# Patient Record
Sex: FEMALE | Race: WHITE | NOT HISPANIC OR LATINO
[De-identification: names, ages, dates, MRNs, and addresses within clinical notes are randomized per-mention and may not be internally consistent; named-entity substitution may affect disease eponyms.]

---

## 2017-11-02 ENCOUNTER — MEDICATION RENEWAL (OUTPATIENT)
Age: 67
End: 2017-11-02

## 2017-11-02 ENCOUNTER — APPOINTMENT (OUTPATIENT)
Dept: OBGYN | Facility: CLINIC | Age: 67
End: 2017-11-02
Payer: COMMERCIAL

## 2017-11-02 VITALS
BODY MASS INDEX: 26.33 KG/M2 | SYSTOLIC BLOOD PRESSURE: 129 MMHG | HEART RATE: 79 BPM | WEIGHT: 158 LBS | DIASTOLIC BLOOD PRESSURE: 79 MMHG | HEIGHT: 65 IN

## 2017-11-02 DIAGNOSIS — Z87.59 PERSONAL HISTORY OF OTHER COMPLICATIONS OF PREGNANCY, CHILDBIRTH AND THE PUERPERIUM: ICD-10-CM

## 2017-11-02 DIAGNOSIS — Z86.73 PERSONAL HISTORY OF TRANSIENT ISCHEMIC ATTACK (TIA), AND CEREBRAL INFARCTION W/OUT RESIDUAL DEFICITS: ICD-10-CM

## 2017-11-02 DIAGNOSIS — Z87.898 PERSONAL HISTORY OF OTHER SPECIFIED CONDITIONS: ICD-10-CM

## 2017-11-02 DIAGNOSIS — Z87.448 PERSONAL HISTORY OF OTHER DISEASES OF URINARY SYSTEM: ICD-10-CM

## 2017-11-02 PROCEDURE — 99213 OFFICE O/P EST LOW 20 MIN: CPT | Mod: 25

## 2017-11-02 PROCEDURE — 99387 INIT PM E/M NEW PAT 65+ YRS: CPT

## 2017-11-02 RX ORDER — CHLORHEXIDINE GLUCONATE, 0.12% ORAL RINSE 1.2 MG/ML
0.12 SOLUTION DENTAL
Qty: 473 | Refills: 0 | Status: COMPLETED | COMMUNITY
Start: 2017-09-05

## 2017-11-02 RX ORDER — CIPROFLOXACIN HYDROCHLORIDE 500 MG/1
500 TABLET, FILM COATED ORAL
Qty: 20 | Refills: 0 | Status: COMPLETED | COMMUNITY
Start: 2017-10-05

## 2017-11-02 RX ORDER — CONJUGATED ESTROGENS 0.62 MG/G
0.62 CREAM VAGINAL
Qty: 90 | Refills: 3 | Status: ACTIVE | COMMUNITY
Start: 2017-11-02 | End: 1900-01-01

## 2017-11-02 RX ORDER — ALPRAZOLAM 0.5 MG/1
0.5 TABLET ORAL
Qty: 90 | Refills: 0 | Status: ACTIVE | COMMUNITY
Start: 2017-08-15

## 2017-11-02 RX ORDER — HYDROCORTISONE 25 MG/G
2.5 CREAM TOPICAL DAILY
Qty: 1 | Refills: 1 | Status: ACTIVE | COMMUNITY
Start: 2017-11-02 | End: 1900-01-01

## 2017-11-03 LAB — HPV HIGH+LOW RISK DNA PNL CVX: NOT DETECTED

## 2017-11-06 LAB — CYTOLOGY CVX/VAG DOC THIN PREP: NORMAL

## 2017-11-08 ENCOUNTER — TRANSCRIPTION ENCOUNTER (OUTPATIENT)
Age: 67
End: 2017-11-08

## 2017-12-11 ENCOUNTER — APPOINTMENT (OUTPATIENT)
Dept: DERMATOLOGY | Facility: CLINIC | Age: 67
End: 2017-12-11
Payer: COMMERCIAL

## 2017-12-11 PROCEDURE — 11200 RMVL SKIN TAGS UP TO&INC 15: CPT | Mod: 59

## 2017-12-11 PROCEDURE — 17110 DESTRUCTION B9 LES UP TO 14: CPT

## 2017-12-11 PROCEDURE — 99203 OFFICE O/P NEW LOW 30 MIN: CPT | Mod: 25

## 2017-12-11 PROCEDURE — D0134: CPT

## 2018-01-30 ENCOUNTER — APPOINTMENT (OUTPATIENT)
Dept: OBGYN | Facility: CLINIC | Age: 68
End: 2018-01-30
Payer: COMMERCIAL

## 2018-01-30 VITALS
BODY MASS INDEX: 26.33 KG/M2 | HEIGHT: 65 IN | DIASTOLIC BLOOD PRESSURE: 78 MMHG | WEIGHT: 158 LBS | SYSTOLIC BLOOD PRESSURE: 124 MMHG

## 2018-01-30 PROCEDURE — 99214 OFFICE O/P EST MOD 30 MIN: CPT

## 2018-01-30 RX ORDER — SIMVASTATIN 20 MG/1
20 TABLET, FILM COATED ORAL
Qty: 90 | Refills: 0 | Status: ACTIVE | COMMUNITY
Start: 2017-12-11

## 2018-01-30 RX ORDER — NITROFURANTOIN MACROCRYSTALS 50 MG/1
50 CAPSULE ORAL
Qty: 40 | Refills: 0 | Status: COMPLETED | COMMUNITY
Start: 2017-10-19 | End: 2018-01-30

## 2018-01-30 RX ORDER — PNEUMOCOCCAL 23-VAL P-SAC VAC 25MCG/0.5
25 VIAL (ML) INJECTION
Qty: 1 | Refills: 0 | Status: DISCONTINUED | COMMUNITY
Start: 2017-09-21 | End: 2018-01-30

## 2018-01-30 RX ORDER — SIMVASTATIN 10 MG/1
10 TABLET, FILM COATED ORAL
Qty: 30 | Refills: 0 | Status: COMPLETED | COMMUNITY
Start: 2017-11-01

## 2018-05-08 ENCOUNTER — APPOINTMENT (OUTPATIENT)
Dept: DERMATOLOGY | Facility: CLINIC | Age: 68
End: 2018-05-08
Payer: COMMERCIAL

## 2018-05-08 PROCEDURE — 99213 OFFICE O/P EST LOW 20 MIN: CPT

## 2018-06-18 ENCOUNTER — APPOINTMENT (OUTPATIENT)
Dept: DERMATOLOGY | Facility: CLINIC | Age: 68
End: 2018-06-18
Payer: COMMERCIAL

## 2018-06-18 PROCEDURE — 99213 OFFICE O/P EST LOW 20 MIN: CPT

## 2018-07-22 PROBLEM — Z86.73 HISTORY OF STROKE: Status: RESOLVED | Noted: 2017-11-02 | Resolved: 2018-07-22

## 2018-09-05 ENCOUNTER — RX RENEWAL (OUTPATIENT)
Age: 68
End: 2018-09-05

## 2018-09-06 ENCOUNTER — RX RENEWAL (OUTPATIENT)
Age: 68
End: 2018-09-06

## 2018-09-06 RX ORDER — HYDROCORTISONE 25 MG/G
2.5 CREAM TOPICAL DAILY
Qty: 1 | Refills: 1 | Status: ACTIVE | COMMUNITY
Start: 2018-09-05 | End: 1900-01-01

## 2018-11-08 ENCOUNTER — APPOINTMENT (OUTPATIENT)
Dept: OBGYN | Facility: CLINIC | Age: 68
End: 2018-11-08
Payer: COMMERCIAL

## 2018-11-08 VITALS
SYSTOLIC BLOOD PRESSURE: 126 MMHG | BODY MASS INDEX: 27.16 KG/M2 | HEIGHT: 65 IN | WEIGHT: 163 LBS | DIASTOLIC BLOOD PRESSURE: 75 MMHG | HEART RATE: 63 BPM

## 2018-11-08 DIAGNOSIS — Z86.19 PERSONAL HISTORY OF OTHER INFECTIOUS AND PARASITIC DISEASES: ICD-10-CM

## 2018-11-08 PROCEDURE — 99213 OFFICE O/P EST LOW 20 MIN: CPT

## 2018-11-08 RX ORDER — TERCONAZOLE 4 MG/G
0.4 CREAM VAGINAL
Qty: 1 | Refills: 1 | Status: ACTIVE | COMMUNITY
Start: 2018-11-08 | End: 1900-01-01

## 2018-11-12 LAB — BACTERIA GENITAL AEROBE CULT: ABNORMAL

## 2018-11-23 ENCOUNTER — MEDICATION RENEWAL (OUTPATIENT)
Age: 68
End: 2018-11-23

## 2018-11-23 RX ORDER — ESTRADIOL 0.1 MG/G
0.1 CREAM VAGINAL
Qty: 1 | Refills: 3 | Status: ACTIVE | COMMUNITY
Start: 2017-11-02 | End: 1900-01-01

## 2018-12-11 ENCOUNTER — APPOINTMENT (OUTPATIENT)
Dept: DERMATOLOGY | Facility: CLINIC | Age: 68
End: 2018-12-11

## 2019-05-20 ENCOUNTER — TRANSCRIPTION ENCOUNTER (OUTPATIENT)
Age: 69
End: 2019-05-20

## 2019-06-04 ENCOUNTER — APPOINTMENT (OUTPATIENT)
Dept: OBGYN | Facility: CLINIC | Age: 69
End: 2019-06-04

## 2019-07-22 ENCOUNTER — APPOINTMENT (OUTPATIENT)
Dept: DERMATOLOGY | Facility: CLINIC | Age: 69
End: 2019-07-22
Payer: COMMERCIAL

## 2019-07-22 PROCEDURE — 99213 OFFICE O/P EST LOW 20 MIN: CPT

## 2019-07-25 ENCOUNTER — APPOINTMENT (OUTPATIENT)
Dept: OBGYN | Facility: CLINIC | Age: 69
End: 2019-07-25
Payer: COMMERCIAL

## 2019-07-25 VITALS
BODY MASS INDEX: 26.66 KG/M2 | SYSTOLIC BLOOD PRESSURE: 123 MMHG | DIASTOLIC BLOOD PRESSURE: 73 MMHG | WEIGHT: 160 LBS | HEIGHT: 65 IN | HEART RATE: 66 BPM

## 2019-07-25 PROCEDURE — 99397 PER PM REEVAL EST PAT 65+ YR: CPT

## 2019-07-25 PROCEDURE — 82270 OCCULT BLOOD FECES: CPT

## 2019-07-25 RX ORDER — CONJUGATED ESTROGENS 0.62 MG/G
0.62 CREAM VAGINAL
Qty: 1 | Refills: 2 | Status: ACTIVE | COMMUNITY
Start: 2019-07-25 | End: 1900-01-01

## 2019-07-25 NOTE — PHYSICAL EXAM
[Awake] : awake [Alert] : alert [Acute Distress] : no acute distress [LAD] : no lymphadenopathy [Thyroid Nodule] : no thyroid nodule [Goiter] : no goiter [Mass] : no breast mass [Nipple Discharge] : no nipple discharge [Axillary LAD] : no axillary lymphadenopathy [Soft] : soft [Tender] : non tender [Distended] : not distended [H/Smegaly] : no hepatosplenomegaly [Oriented x3] : oriented to person, place, and time [Depressed Mood] : not depressed [Flat Affect] : affect not flat [Labia Majora] : labia major [Labia Minora] : labia minora [Normal] : clitoris [No Bleeding] : there was no active vaginal bleeding [Pap Obtained] : a Pap smear was performed [Uterine Adnexae] : were not tender and not enlarged [No Tenderness] : no rectal tenderness [Occult Blood] : occult blood test from digital rectal exam was negative [Nl Sphincter Tone] : normal sphincter tone

## 2019-07-27 LAB — HPV HIGH+LOW RISK DNA PNL CVX: NOT DETECTED

## 2019-08-03 LAB — CYTOLOGY CVX/VAG DOC THIN PREP: NORMAL

## 2019-12-10 ENCOUNTER — MED ADMIN CHARGE (OUTPATIENT)
Age: 69
End: 2019-12-10

## 2020-12-16 PROBLEM — Z86.19 HISTORY OF CANDIDIASIS OF VAGINA: Status: RESOLVED | Noted: 2018-11-08 | Resolved: 2020-12-16

## 2021-04-22 ENCOUNTER — APPOINTMENT (OUTPATIENT)
Dept: OBGYN | Facility: CLINIC | Age: 71
End: 2021-04-22
Payer: MEDICARE

## 2021-04-22 VITALS
WEIGHT: 160 LBS | SYSTOLIC BLOOD PRESSURE: 125 MMHG | BODY MASS INDEX: 26.66 KG/M2 | HEIGHT: 65 IN | DIASTOLIC BLOOD PRESSURE: 83 MMHG | HEART RATE: 80 BPM

## 2021-04-22 DIAGNOSIS — E04.2 NONTOXIC MULTINODULAR GOITER: ICD-10-CM

## 2021-04-22 DIAGNOSIS — Z78.0 ASYMPTOMATIC MENOPAUSAL STATE: ICD-10-CM

## 2021-04-22 DIAGNOSIS — N95.2 POSTMENOPAUSAL ATROPHIC VAGINITIS: ICD-10-CM

## 2021-04-22 PROCEDURE — G0101: CPT

## 2021-04-22 PROCEDURE — 82270 OCCULT BLOOD FECES: CPT

## 2021-04-22 NOTE — HISTORY OF PRESENT ILLNESS
[FreeTextEntry1] : 69 yo here for atrophic vaginitis and breast exam. Needs a colonoscopy, Right breast  mass. \par also has thyroid nodules not followed.She seems to feel the thyroid now. Wants to loose some weight [Previously active] : previously active [Men] : men

## 2021-04-22 NOTE — DISCUSSION/SUMMARY
[FreeTextEntry1] : Discussed with the pt the importance of exercise weight bearing,yoga, aerobics good variety, \par calcium totalling  mg between food and vitamins also vitamin D 2000iu daily, fish oil. \par ALso that any drop of blood after menapause is not good. \par FU in one year. \par  Discussed vaginal lubricants OTC like luvena moisturizers,ky ovule and relplense . There are also vaginal extrogens, and coconut oil with intercourse.\par discussed colonoscopy and derma appt.\par zTHyroid sono will call. \par disc 30 day challenge for weight

## 2021-04-22 NOTE — PHYSICAL EXAM

## 2021-04-23 ENCOUNTER — APPOINTMENT (OUTPATIENT)
Dept: DERMATOLOGY | Facility: CLINIC | Age: 71
End: 2021-04-23
Payer: MEDICARE

## 2021-04-23 PROCEDURE — 99213 OFFICE O/P EST LOW 20 MIN: CPT

## 2021-06-04 ENCOUNTER — TRANSCRIPTION ENCOUNTER (OUTPATIENT)
Age: 71
End: 2021-06-04

## 2021-06-10 ENCOUNTER — OUTPATIENT (OUTPATIENT)
Dept: OUTPATIENT SERVICES | Facility: HOSPITAL | Age: 71
LOS: 1 days | End: 2021-06-10
Payer: MEDICARE

## 2021-06-10 ENCOUNTER — RESULT REVIEW (OUTPATIENT)
Age: 71
End: 2021-06-10

## 2021-06-10 ENCOUNTER — APPOINTMENT (OUTPATIENT)
Dept: RADIOLOGY | Facility: CLINIC | Age: 71
End: 2021-06-10
Payer: MEDICARE

## 2021-06-10 ENCOUNTER — APPOINTMENT (OUTPATIENT)
Dept: MAMMOGRAPHY | Facility: CLINIC | Age: 71
End: 2021-06-10
Payer: MEDICARE

## 2021-06-10 DIAGNOSIS — R92.1 MAMMOGRAPHIC CALCIFICATION FOUND ON DIAGNOSTIC IMAGING OF BREAST: ICD-10-CM

## 2021-06-10 DIAGNOSIS — Z13.820 ENCOUNTER FOR SCREENING FOR OSTEOPOROSIS: ICD-10-CM

## 2021-06-10 DIAGNOSIS — Z12.31 ENCOUNTER FOR SCREENING MAMMOGRAM FOR MALIGNANT NEOPLASM OF BREAST: ICD-10-CM

## 2021-06-10 PROCEDURE — 77067 SCR MAMMO BI INCL CAD: CPT | Mod: 26

## 2021-06-10 PROCEDURE — 77067 SCR MAMMO BI INCL CAD: CPT

## 2021-06-10 PROCEDURE — 77080 DXA BONE DENSITY AXIAL: CPT | Mod: 26

## 2021-06-10 PROCEDURE — 77063 BREAST TOMOSYNTHESIS BI: CPT

## 2021-06-10 PROCEDURE — 77080 DXA BONE DENSITY AXIAL: CPT

## 2021-06-10 PROCEDURE — 77063 BREAST TOMOSYNTHESIS BI: CPT | Mod: 26

## 2021-06-22 ENCOUNTER — OUTPATIENT (OUTPATIENT)
Dept: OUTPATIENT SERVICES | Facility: HOSPITAL | Age: 71
LOS: 1 days | End: 2021-06-22
Payer: MEDICARE

## 2021-06-22 ENCOUNTER — APPOINTMENT (OUTPATIENT)
Dept: MAMMOGRAPHY | Facility: CLINIC | Age: 71
End: 2021-06-22
Payer: MEDICARE

## 2021-06-22 ENCOUNTER — RESULT REVIEW (OUTPATIENT)
Age: 71
End: 2021-06-22

## 2021-06-22 DIAGNOSIS — R92.8 OTHER ABNORMAL AND INCONCLUSIVE FINDINGS ON DIAGNOSTIC IMAGING OF BREAST: ICD-10-CM

## 2021-06-22 PROCEDURE — 77065 DX MAMMO INCL CAD UNI: CPT

## 2021-06-22 PROCEDURE — 77065 DX MAMMO INCL CAD UNI: CPT | Mod: 26,RT

## 2021-06-22 PROCEDURE — G0279: CPT | Mod: 26

## 2021-06-22 PROCEDURE — G0279: CPT

## 2021-07-01 ENCOUNTER — APPOINTMENT (OUTPATIENT)
Dept: MAMMOGRAPHY | Facility: CLINIC | Age: 71
End: 2021-07-01
Payer: MEDICARE

## 2021-07-01 ENCOUNTER — RESULT REVIEW (OUTPATIENT)
Age: 71
End: 2021-07-01

## 2021-07-01 ENCOUNTER — OUTPATIENT (OUTPATIENT)
Dept: OUTPATIENT SERVICES | Facility: HOSPITAL | Age: 71
LOS: 1 days | End: 2021-07-01
Payer: MEDICARE

## 2021-07-01 DIAGNOSIS — R92.1 MAMMOGRAPHIC CALCIFICATION FOUND ON DIAGNOSTIC IMAGING OF BREAST: ICD-10-CM

## 2021-07-01 PROCEDURE — A4648: CPT

## 2021-07-01 PROCEDURE — 19081 BX BREAST 1ST LESION STRTCTC: CPT | Mod: RT

## 2021-07-01 PROCEDURE — 88305 TISSUE EXAM BY PATHOLOGIST: CPT

## 2021-07-01 PROCEDURE — 77065 DX MAMMO INCL CAD UNI: CPT

## 2021-07-01 PROCEDURE — 77065 DX MAMMO INCL CAD UNI: CPT | Mod: 26,RT

## 2021-07-01 PROCEDURE — 19081 BX BREAST 1ST LESION STRTCTC: CPT

## 2021-07-01 PROCEDURE — 88305 TISSUE EXAM BY PATHOLOGIST: CPT | Mod: 26

## 2021-07-12 DIAGNOSIS — K64.9 UNSPECIFIED HEMORRHOIDS: ICD-10-CM

## 2021-07-12 RX ORDER — HYDROCORTISONE 10 MG/G
1 CREAM TOPICAL
Qty: 1 | Refills: 1 | Status: ACTIVE | COMMUNITY
Start: 2021-07-12 | End: 1900-01-01

## 2022-07-01 ENCOUNTER — APPOINTMENT (OUTPATIENT)
Dept: ORTHOPEDIC SURGERY | Facility: CLINIC | Age: 72
End: 2022-07-01

## 2022-08-29 ENCOUNTER — NON-APPOINTMENT (OUTPATIENT)
Age: 72
End: 2022-08-29

## 2022-09-06 ENCOUNTER — APPOINTMENT (OUTPATIENT)
Dept: OBGYN | Facility: CLINIC | Age: 72
End: 2022-09-06

## 2022-09-06 VITALS
DIASTOLIC BLOOD PRESSURE: 70 MMHG | WEIGHT: 156 LBS | BODY MASS INDEX: 25.99 KG/M2 | HEIGHT: 65 IN | SYSTOLIC BLOOD PRESSURE: 110 MMHG

## 2022-09-06 DIAGNOSIS — Z01.419 ENCOUNTER FOR GYNECOLOGICAL EXAMINATION (GENERAL) (ROUTINE) W/OUT ABNORMAL FINDINGS: ICD-10-CM

## 2022-09-06 DIAGNOSIS — R14.0 ABDOMINAL DISTENSION (GASEOUS): ICD-10-CM

## 2022-09-06 DIAGNOSIS — M85.80 OTHER SPECIFIED DISORDERS OF BONE DENSITY AND STRUCTURE, UNSPECIFIED SITE: ICD-10-CM

## 2022-09-06 DIAGNOSIS — Z12.39 ENCOUNTER FOR OTHER SCREENING FOR MALIGNANT NEOPLASM OF BREAST: ICD-10-CM

## 2022-09-06 DIAGNOSIS — Z12.11 ENCOUNTER FOR SCREENING FOR MALIGNANT NEOPLASM OF COLON: ICD-10-CM

## 2022-09-06 PROCEDURE — 82270 OCCULT BLOOD FECES: CPT

## 2022-09-06 PROCEDURE — G0101: CPT

## 2022-09-06 RX ORDER — METOPROLOL SUCCINATE 25 MG/1
25 TABLET, EXTENDED RELEASE ORAL
Qty: 90 | Refills: 0 | Status: COMPLETED | COMMUNITY
Start: 2017-12-11 | End: 2022-09-06

## 2022-09-06 RX ORDER — MECLIZINE HYDROCHLORIDE 25 MG/1
25 TABLET ORAL
Qty: 30 | Refills: 0 | Status: COMPLETED | COMMUNITY
Start: 2018-01-11 | End: 2022-09-06

## 2022-09-06 RX ORDER — GABAPENTIN 100 MG/1
100 CAPSULE ORAL
Qty: 270 | Refills: 0 | Status: ACTIVE | COMMUNITY
Start: 2022-06-07

## 2022-09-06 RX ORDER — COVID-19 MOLECULAR TEST ASSAY
KIT MISCELLANEOUS
Qty: 8 | Refills: 0 | Status: ACTIVE | COMMUNITY
Start: 2022-04-13

## 2022-09-06 RX ORDER — DIAZEPAM 5 MG/1
5 TABLET ORAL
Qty: 14 | Refills: 0 | Status: COMPLETED | COMMUNITY
Start: 2022-06-27

## 2022-09-06 RX ORDER — HYDROCORTISONE 25 MG/G
2.5 CREAM TOPICAL
Qty: 1 | Refills: 1 | Status: ACTIVE | COMMUNITY
Start: 2022-09-06 | End: 1900-01-01

## 2022-09-06 RX ORDER — FLUOROMETHOLONE 1 MG/ML
0.1 SOLUTION/ DROPS OPHTHALMIC
Qty: 10 | Refills: 0 | Status: COMPLETED | COMMUNITY
Start: 2017-09-06 | End: 2022-09-06

## 2022-09-06 NOTE — HISTORY OF PRESENT ILLNESS
[FreeTextEntry1] : 70 yo here for a breast exam and rectal exam , having bloating, nopmp bleeding,admits to vaginal dryness, not active.\par Going to have colonoscopy soon and is following up with derm

## 2022-09-08 ENCOUNTER — TRANSCRIPTION ENCOUNTER (OUTPATIENT)
Age: 72
End: 2022-09-08

## 2022-09-08 RX ORDER — ESTRADIOL 0.1 MG/G
0.1 CREAM VAGINAL
Qty: 1 | Refills: 6 | Status: ACTIVE | COMMUNITY
Start: 2019-12-10

## 2022-10-04 ENCOUNTER — APPOINTMENT (OUTPATIENT)
Dept: OBGYN | Facility: CLINIC | Age: 72
End: 2022-10-04

## 2022-12-08 ENCOUNTER — RESULT REVIEW (OUTPATIENT)
Age: 72
End: 2022-12-08

## 2022-12-08 ENCOUNTER — APPOINTMENT (OUTPATIENT)
Dept: MAMMOGRAPHY | Facility: CLINIC | Age: 72
End: 2022-12-08

## 2022-12-08 ENCOUNTER — OUTPATIENT (OUTPATIENT)
Dept: OUTPATIENT SERVICES | Facility: HOSPITAL | Age: 72
LOS: 1 days | End: 2022-12-08
Payer: MEDICARE

## 2022-12-08 DIAGNOSIS — Z12.39 ENCOUNTER FOR OTHER SCREENING FOR MALIGNANT NEOPLASM OF BREAST: ICD-10-CM

## 2022-12-08 PROCEDURE — 77063 BREAST TOMOSYNTHESIS BI: CPT | Mod: 26

## 2022-12-08 PROCEDURE — 77063 BREAST TOMOSYNTHESIS BI: CPT

## 2022-12-08 PROCEDURE — 77067 SCR MAMMO BI INCL CAD: CPT | Mod: 26

## 2022-12-08 PROCEDURE — 77067 SCR MAMMO BI INCL CAD: CPT

## 2022-12-20 ENCOUNTER — APPOINTMENT (OUTPATIENT)
Dept: OBGYN | Facility: CLINIC | Age: 72
End: 2022-12-20

## 2022-12-20 ENCOUNTER — ASOB RESULT (OUTPATIENT)
Age: 72
End: 2022-12-20

## 2022-12-20 VITALS
HEIGHT: 65 IN | DIASTOLIC BLOOD PRESSURE: 82 MMHG | BODY MASS INDEX: 25.83 KG/M2 | WEIGHT: 155 LBS | SYSTOLIC BLOOD PRESSURE: 122 MMHG

## 2022-12-20 DIAGNOSIS — N85.8 OTHER SPECIFIED NONINFLAMMATORY DISORDERS OF UTERUS: ICD-10-CM

## 2022-12-20 PROCEDURE — 76856 US EXAM PELVIC COMPLETE: CPT | Mod: 59

## 2022-12-20 PROCEDURE — 76830 TRANSVAGINAL US NON-OB: CPT

## 2022-12-20 PROCEDURE — 99213 OFFICE O/P EST LOW 20 MIN: CPT

## 2022-12-20 RX ORDER — MISOPROSTOL 200 UG/1
200 TABLET ORAL
Qty: 2 | Refills: 0 | Status: ACTIVE | COMMUNITY
Start: 2022-12-20 | End: 1900-01-01

## 2022-12-20 NOTE — HISTORY OF PRESENT ILLNESS
[FreeTextEntry1] : 71 yo here for follow up for bloating and a ct scan that had a thick linning. today the  linning is 3mm today with fluid. I disc that she needs an EMBX , that the fluid could be from a stinosis.\par Ct said diverticulosis, and thick uterine linning

## 2023-01-13 ENCOUNTER — APPOINTMENT (OUTPATIENT)
Dept: OBGYN | Facility: CLINIC | Age: 73
End: 2023-01-13
Payer: MEDICARE

## 2023-01-13 VITALS
WEIGHT: 156 LBS | DIASTOLIC BLOOD PRESSURE: 82 MMHG | HEIGHT: 65 IN | BODY MASS INDEX: 25.99 KG/M2 | SYSTOLIC BLOOD PRESSURE: 140 MMHG

## 2023-01-13 DIAGNOSIS — R93.89 ABNORMAL FINDINGS ON DIAGNOSTIC IMAGING OF OTHER SPECIFIED BODY STRUCTURES: ICD-10-CM

## 2023-01-13 PROCEDURE — 99213 OFFICE O/P EST LOW 20 MIN: CPT | Mod: 25

## 2023-01-13 PROCEDURE — 58558Z: CUSTOM

## 2023-01-15 PROBLEM — R93.89 THICKENED ENDOMETRIUM: Status: ACTIVE | Noted: 2023-01-15

## 2023-01-15 NOTE — HISTORY OF PRESENT ILLNESS
[FreeTextEntry1] : 73 yo here for follow up for bloating and a ct scan that had a thick linning. today the  linning is 3mm today with fluid. she is here for  an EMBX ,had diarrhea since taking cytotec. .\par Ct said diverticulosis, and thick uterine linning

## 2023-01-15 NOTE — PROCEDURE
[Hysteroscopy] : Hysteroscopy [Time out performed] : Pre-procedure time out performed.  Patient's name, date of birth and procedure confirmed. [Consent Obtained] : Consent obtained [Risks] : risks [Benefits] : benefits [Alternatives] : alternatives [Patient] : patient [Infection] : infection [Bleeding] : bleeding [Allergic Reaction] : allergic reaction [flexible] : Using aseptic technique a hysteroscopy was performed using a flexible hysteroscope [Sent to Pathology] : specimen was placed in buffered formalin and sent for pathology [Antibiotics given] : antibiotics given [Hemostasis obtained] : hemostasis obtained [Tolerated Well] : Patient tolerated the procedure well [Aftercare instructions/regstrictions given and follow-up scheduled] : Aftercare instructions/restrictions given and follow-up scheduled [de-identified] : atrophic cavity\par stenotic os, required dilation. \par scant tissue obtained oon endo bx.

## 2023-01-15 NOTE — PLAN
[FreeTextEntry1] : tightly stenotic os, dilated w metal dilator, cavity appeared atrophic. endo bx done, scant tissue. call w path. \par stenosis likely accounts for fluid noted in endo cavity on us.

## 2023-02-08 ENCOUNTER — OFFICE (OUTPATIENT)
Dept: URBAN - METROPOLITAN AREA CLINIC 114 | Facility: CLINIC | Age: 73
Setting detail: OPHTHALMOLOGY
End: 2023-02-08
Payer: MEDICARE

## 2023-02-08 DIAGNOSIS — H43.813: ICD-10-CM

## 2023-02-08 DIAGNOSIS — H10.9: ICD-10-CM

## 2023-02-08 DIAGNOSIS — Z96.1: ICD-10-CM

## 2023-02-08 DIAGNOSIS — M35.00: ICD-10-CM

## 2023-02-08 DIAGNOSIS — H35.40: ICD-10-CM

## 2023-02-08 DIAGNOSIS — H40.013: ICD-10-CM

## 2023-02-08 DIAGNOSIS — H11.153: ICD-10-CM

## 2023-02-08 DIAGNOSIS — H26.492: ICD-10-CM

## 2023-02-08 PROCEDURE — 92014 COMPRE OPH EXAM EST PT 1/>: CPT | Performed by: SPECIALIST

## 2023-02-08 ASSESSMENT — REFRACTION_MANIFEST
OS_SPHERE: PLANO
OS_VA2: 20/20(J1+)
OD_SPHERE: PLANO
OD_VA2: 20/20(J1+)
OS_ADD: +2.50
OD_ADD: +2.50

## 2023-02-08 ASSESSMENT — REFRACTION_AUTOREFRACTION
OS_CYLINDER: -0.25
OD_SPHERE: +0.25
OS_AXIS: 85
OD_CYLINDER: -0.50
OS_SPHERE: +0.25
OD_AXIS: 125

## 2023-02-08 ASSESSMENT — REFRACTION_CURRENTRX
OS_CYLINDER: -0.25
OS_OVR_VA: 20/
OS_SPHERE: +2.25
OS_AXIS: 105
OD_SPHERE: +2.75
OD_AXIS: 65
OD_CYLINDER: -0.50
OD_OVR_VA: 20/

## 2023-02-08 ASSESSMENT — TONOMETRY
OD_IOP_MMHG: 17
OS_IOP_MMHG: 17

## 2023-02-08 ASSESSMENT — VISUAL ACUITY
OS_BCVA: 20/20-1
OD_BCVA: 20/25

## 2023-02-08 ASSESSMENT — PACHYMETRY
OD_CT_CORRECTION: -1
OS_CT_UM: 552
OS_CT_CORRECTION: -1
OD_CT_UM: 559

## 2023-02-08 ASSESSMENT — LID EXAM ASSESSMENTS: OD_COMMENTS: DERMATITIS RUL - MILD ERYTHEMA AND FLAKING

## 2023-02-08 ASSESSMENT — SPHEQUIV_DERIVED
OS_SPHEQUIV: 0.125
OD_SPHEQUIV: 0

## 2023-02-08 ASSESSMENT — CONFRONTATIONAL VISUAL FIELD TEST (CVF)
OD_FINDINGS: FULL
OS_FINDINGS: FULL

## 2023-02-22 ENCOUNTER — RX ONLY (RX ONLY)
Age: 73
End: 2023-02-22

## 2023-02-22 ENCOUNTER — OFFICE (OUTPATIENT)
Dept: URBAN - METROPOLITAN AREA CLINIC 13 | Facility: CLINIC | Age: 73
Setting detail: OPHTHALMOLOGY
End: 2023-02-22
Payer: MEDICARE

## 2023-02-22 DIAGNOSIS — H26.492: ICD-10-CM

## 2023-02-22 PROBLEM — H10.9 CONJUNCTIVITIS, UNSPECIFIED: Status: ACTIVE | Noted: 2023-02-08

## 2023-02-22 PROCEDURE — 66821 AFTER CATARACT LASER SURGERY: CPT | Performed by: SPECIALIST

## 2023-02-22 ASSESSMENT — SPHEQUIV_DERIVED: OS_SPHEQUIV: 0

## 2023-02-22 ASSESSMENT — REFRACTION_MANIFEST
OS_SPHERE: +0.50
OS_VA1: 20/40
OS_CYLINDER: -1.00
OS_AXIS: 90

## 2023-02-22 ASSESSMENT — LID EXAM ASSESSMENTS: OD_COMMENTS: DERMATITIS RUL - MILD ERYTHEMA AND FLAKING

## 2023-02-22 ASSESSMENT — VISUAL ACUITY
OD_BCVA: 20/40
OS_BCVA: 20/20

## 2023-02-22 ASSESSMENT — REFRACTION_CURRENTRX
OD_OVR_VA: 20/
OS_OVR_VA: 20/

## 2023-03-14 ENCOUNTER — OFFICE (OUTPATIENT)
Dept: URBAN - METROPOLITAN AREA CLINIC 104 | Facility: CLINIC | Age: 73
Setting detail: OPHTHALMOLOGY
End: 2023-03-14
Payer: MEDICARE

## 2023-03-14 DIAGNOSIS — H26.492: ICD-10-CM

## 2023-03-14 DIAGNOSIS — B30.9: ICD-10-CM

## 2023-03-14 PROCEDURE — 92014 COMPRE OPH EXAM EST PT 1/>: CPT | Performed by: SPECIALIST

## 2023-03-14 ASSESSMENT — SPHEQUIV_DERIVED
OS_SPHEQUIV: -0.25
OD_SPHEQUIV: 0
OS_SPHEQUIV: 0

## 2023-03-14 ASSESSMENT — KERATOMETRY
OD_AXISANGLE_DEGREES: 056
OS_K1POWER_DIOPTERS: 43.49
OS_AXISANGLE_DEGREES: 131
OD_K2POWER_DIOPTERS: 43.89
OD_K1POWER_DIOPTERS: 43.44
OS_K2POWER_DIOPTERS: 44.18

## 2023-03-14 ASSESSMENT — PACHYMETRY
OD_CT_CORRECTION: -1
OS_CT_UM: 552
OD_CT_UM: 559
OS_CT_CORRECTION: -1

## 2023-03-14 ASSESSMENT — REFRACTION_AUTOREFRACTION
OD_AXIS: 103
OS_SPHERE: +0.25
OD_CYLINDER: -1.00
OS_CYLINDER: -1.00
OD_SPHERE: +0.50
OS_AXIS: 073

## 2023-03-14 ASSESSMENT — CONFRONTATIONAL VISUAL FIELD TEST (CVF)
OS_FINDINGS: FULL
OD_FINDINGS: FULL

## 2023-03-14 ASSESSMENT — AXIALLENGTH_DERIVED
OS_AL: 23.4695
OD_AL: 23.53
OS_AL: 23.57

## 2023-03-14 ASSESSMENT — VISUAL ACUITY
OD_BCVA: 20/25
OS_BCVA: 20/25

## 2023-03-14 ASSESSMENT — TONOMETRY: OS_IOP_MMHG: 16

## 2023-03-14 ASSESSMENT — REFRACTION_MANIFEST
OS_SPHERE: +0.50
OS_AXIS: 90
OS_CYLINDER: -1.00
OS_VA1: 20/40

## 2023-03-14 ASSESSMENT — LID EXAM ASSESSMENTS: OD_COMMENTS: DERMATITIS RUL - MILD ERYTHEMA AND FLAKING

## 2023-05-04 ENCOUNTER — APPOINTMENT (OUTPATIENT)
Dept: DERMATOLOGY | Facility: CLINIC | Age: 73
End: 2023-05-04

## 2023-05-08 ENCOUNTER — APPOINTMENT (OUTPATIENT)
Dept: DERMATOLOGY | Facility: CLINIC | Age: 73
End: 2023-05-08
Payer: MEDICARE

## 2023-05-08 PROCEDURE — 99213 OFFICE O/P EST LOW 20 MIN: CPT

## 2023-09-13 ENCOUNTER — OFFICE (OUTPATIENT)
Dept: URBAN - METROPOLITAN AREA CLINIC 114 | Facility: CLINIC | Age: 73
Setting detail: OPHTHALMOLOGY
End: 2023-09-13
Payer: MEDICARE

## 2023-09-13 DIAGNOSIS — H40.013: ICD-10-CM

## 2023-09-13 DIAGNOSIS — H04.123: ICD-10-CM

## 2023-09-13 DIAGNOSIS — H26.493: ICD-10-CM

## 2023-09-13 DIAGNOSIS — H11.153: ICD-10-CM

## 2023-09-13 DIAGNOSIS — Z96.1: ICD-10-CM

## 2023-09-13 DIAGNOSIS — H35.40: ICD-10-CM

## 2023-09-13 DIAGNOSIS — H43.813: ICD-10-CM

## 2023-09-13 DIAGNOSIS — M35.00: ICD-10-CM

## 2023-09-13 PROCEDURE — 92014 COMPRE OPH EXAM EST PT 1/>: CPT | Performed by: SPECIALIST

## 2023-09-13 ASSESSMENT — REFRACTION_CURRENTRX
OS_CYLINDER: -0.50
OS_OVR_VA: 20/
OS_SPHERE: +0.50
OD_OVR_VA: 20/
OD_SPHERE: +0.25
OD_AXIS: 67
OS_AXIS: 119
OD_ADD: +2.50
OS_ADD: +2.50
OD_CYLINDER: -0.25

## 2023-09-13 ASSESSMENT — TONOMETRY
OD_IOP_MMHG: 17
OS_IOP_MMHG: 17

## 2023-09-13 ASSESSMENT — SPHEQUIV_DERIVED
OD_SPHEQUIV: 0.5
OS_SPHEQUIV: 0

## 2023-09-13 ASSESSMENT — CONFRONTATIONAL VISUAL FIELD TEST (CVF)
OD_FINDINGS: FULL
OS_FINDINGS: FULL

## 2023-09-13 ASSESSMENT — PACHYMETRY
OD_CT_UM: 559
OD_CT_CORRECTION: -1
OS_CT_CORRECTION: -1
OS_CT_UM: 552

## 2023-09-13 ASSESSMENT — REFRACTION_AUTOREFRACTION
OD_AXIS: 110
OD_CYLINDER: -1.00
OS_CYLINDER: -1.00
OS_SPHERE: +0.50
OS_AXIS: 53
OD_SPHERE: +1.00

## 2023-09-13 ASSESSMENT — VISUAL ACUITY
OD_BCVA: 20/25
OS_BCVA: 20/25

## 2023-09-13 ASSESSMENT — TEAR BREAK UP TIME (TBUT)
OS_TBUT: 2+
OD_TBUT: 2+ 3+

## 2023-09-13 ASSESSMENT — LID EXAM ASSESSMENTS: OD_COMMENTS: DERMATITIS RUL - MILD ERYTHEMA AND FLAKING

## 2023-12-28 ENCOUNTER — OFFICE (OUTPATIENT)
Dept: URBAN - METROPOLITAN AREA CLINIC 104 | Facility: CLINIC | Age: 73
Setting detail: OPHTHALMOLOGY
End: 2023-12-28
Payer: MEDICARE

## 2023-12-28 DIAGNOSIS — H40.013: ICD-10-CM

## 2023-12-28 DIAGNOSIS — H35.40: ICD-10-CM

## 2023-12-28 DIAGNOSIS — M35.00: ICD-10-CM

## 2023-12-28 DIAGNOSIS — H04.123: ICD-10-CM

## 2023-12-28 DIAGNOSIS — H43.813: ICD-10-CM

## 2023-12-28 DIAGNOSIS — H26.493: ICD-10-CM

## 2023-12-28 DIAGNOSIS — Z96.1: ICD-10-CM

## 2023-12-28 DIAGNOSIS — H11.153: ICD-10-CM

## 2023-12-28 PROCEDURE — 92014 COMPRE OPH EXAM EST PT 1/>: CPT | Performed by: SPECIALIST

## 2023-12-28 ASSESSMENT — TEAR BREAK UP TIME (TBUT)
OD_TBUT: 2+ 3+
OS_TBUT: 2+

## 2023-12-28 ASSESSMENT — REFRACTION_AUTOREFRACTION
OS_CYLINDER: -0.25
OD_CYLINDER: -1.00
OS_AXIS: 066
OS_SPHERE: +0.25
OD_SPHERE: PLANO
OD_AXIS: 115

## 2023-12-28 ASSESSMENT — REFRACTION_MANIFEST
OD_CYLINDER: -0.50
OD_SPHERE: PLANO
OD_AXIS: 115
OD_VA1: 20/20

## 2023-12-28 ASSESSMENT — CONFRONTATIONAL VISUAL FIELD TEST (CVF)
OD_FINDINGS: FULL
OS_FINDINGS: FULL

## 2023-12-28 ASSESSMENT — REFRACTION_CURRENTRX
OS_OVR_VA: 20/
OD_OVR_VA: 20/

## 2023-12-28 ASSESSMENT — SPHEQUIV_DERIVED: OS_SPHEQUIV: 0.125

## 2023-12-28 ASSESSMENT — LID EXAM ASSESSMENTS: OD_COMMENTS: DERMATITIS RUL - MILD ERYTHEMA AND FLAKING

## 2024-05-09 ENCOUNTER — APPOINTMENT (OUTPATIENT)
Dept: DERMATOLOGY | Facility: CLINIC | Age: 74
End: 2024-05-09
Payer: MEDICARE

## 2024-05-09 PROCEDURE — 99213 OFFICE O/P EST LOW 20 MIN: CPT

## 2024-06-12 ENCOUNTER — OFFICE (OUTPATIENT)
Dept: URBAN - METROPOLITAN AREA CLINIC 113 | Facility: CLINIC | Age: 74
Setting detail: OPHTHALMOLOGY
End: 2024-06-12
Payer: MEDICARE

## 2024-06-12 DIAGNOSIS — H11.32: ICD-10-CM

## 2024-06-12 DIAGNOSIS — M35.00: ICD-10-CM

## 2024-06-12 DIAGNOSIS — H11.153: ICD-10-CM

## 2024-06-12 DIAGNOSIS — H04.123: ICD-10-CM

## 2024-06-12 PROBLEM — H26.491 POSTERIOR CAPSULAR OPACIFICATION;  , RIGHT EYE: Status: ACTIVE | Noted: 2024-06-12

## 2024-06-12 PROCEDURE — 99213 OFFICE O/P EST LOW 20 MIN: CPT | Performed by: STUDENT IN AN ORGANIZED HEALTH CARE EDUCATION/TRAINING PROGRAM

## 2024-06-12 ASSESSMENT — CONFRONTATIONAL VISUAL FIELD TEST (CVF)
OS_FINDINGS: FULL
OD_FINDINGS: FULL

## 2024-06-12 ASSESSMENT — LID EXAM ASSESSMENTS: OD_COMMENTS: DERMATITIS RUL - MILD ERYTHEMA AND FLAKING

## 2024-08-31 ENCOUNTER — NON-APPOINTMENT (OUTPATIENT)
Age: 74
End: 2024-08-31

## 2024-10-24 ENCOUNTER — OFFICE (OUTPATIENT)
Dept: URBAN - METROPOLITAN AREA CLINIC 113 | Facility: CLINIC | Age: 74
Setting detail: OPHTHALMOLOGY
End: 2024-10-24
Payer: MEDICARE

## 2024-10-24 DIAGNOSIS — H16.223: ICD-10-CM

## 2024-10-24 DIAGNOSIS — H00.14: ICD-10-CM

## 2024-10-24 DIAGNOSIS — H04.123: ICD-10-CM

## 2024-10-24 PROBLEM — H11.151 PINGUECULA;  , RIGHT EYE: Status: ACTIVE | Noted: 2024-10-24

## 2024-10-24 PROCEDURE — 99213 OFFICE O/P EST LOW 20 MIN: CPT | Performed by: OPHTHALMOLOGY

## 2024-10-24 ASSESSMENT — PACHYMETRY
OD_CT_CORRECTION: -1
OS_CT_UM: 552
OS_CT_CORRECTION: -1
OD_CT_UM: 559

## 2024-10-24 ASSESSMENT — TEAR BREAK UP TIME (TBUT)
OS_TBUT: 2+
OD_TBUT: 2+ 3+

## 2024-10-24 ASSESSMENT — TONOMETRY
OD_IOP_MMHG: 15
OS_IOP_MMHG: 18

## 2024-10-24 ASSESSMENT — CONFRONTATIONAL VISUAL FIELD TEST (CVF)
OS_FINDINGS: FULL
OD_FINDINGS: FULL

## 2024-10-24 ASSESSMENT — LID EXAM ASSESSMENTS: OD_COMMENTS: DERMATITIS RUL - MILD ERYTHEMA AND FLAKING

## 2024-10-26 PROBLEM — H11.32 SUBCONJUNCTIVAL HEMORRHAGE ; LEFT EYE: Status: ACTIVE | Noted: 2024-10-24

## 2024-10-26 ASSESSMENT — REFRACTION_MANIFEST
OD_VA1: 20/20
OD_CYLINDER: -0.50
OD_SPHERE: PLANO
OD_AXIS: 115

## 2024-10-26 ASSESSMENT — REFRACTION_AUTOREFRACTION
OS_AXIS: 070
OD_SPHERE: +0.50
OS_SPHERE: +0.50
OD_CYLINDER: -1.00
OD_AXIS: 118
OS_CYLINDER: -0.75

## 2024-10-26 ASSESSMENT — VISUAL ACUITY
OS_BCVA: 20/25+
OD_BCVA: 20/25-

## 2024-10-26 ASSESSMENT — KERATOMETRY
OD_K2POWER_DIOPTERS: 44.25
OS_K1POWER_DIOPTERS: 43.25
OS_K2POWER_DIOPTERS: 44.25
OD_AXISANGLE_DEGREES: 027
OS_AXISANGLE_DEGREES: 109
OD_K1POWER_DIOPTERS: 43.75

## 2024-11-04 ENCOUNTER — APPOINTMENT (OUTPATIENT)
Dept: OBGYN | Facility: CLINIC | Age: 74
End: 2024-11-04
Payer: MEDICARE

## 2024-11-04 VITALS
DIASTOLIC BLOOD PRESSURE: 82 MMHG | SYSTOLIC BLOOD PRESSURE: 126 MMHG | BODY MASS INDEX: 19.33 KG/M2 | HEIGHT: 65 IN | WEIGHT: 116 LBS

## 2024-11-04 DIAGNOSIS — Z00.00 ENCOUNTER FOR GENERAL ADULT MEDICAL EXAMINATION W/OUT ABNORMAL FINDINGS: ICD-10-CM

## 2024-11-04 DIAGNOSIS — N95.2 POSTMENOPAUSAL ATROPHIC VAGINITIS: ICD-10-CM

## 2024-11-04 DIAGNOSIS — N76.3 SUBACUTE AND CHRONIC VULVITIS: ICD-10-CM

## 2024-11-04 PROCEDURE — G0101: CPT

## 2024-11-04 PROCEDURE — 99213 OFFICE O/P EST LOW 20 MIN: CPT | Mod: 25

## 2024-11-04 RX ORDER — CLOTRIMAZOLE AND BETAMETHASONE DIPROPIONATE 10; .5 MG/G; MG/G
1-0.05 CREAM TOPICAL TWICE DAILY
Qty: 1 | Refills: 1 | Status: ACTIVE | COMMUNITY
Start: 2024-11-04 | End: 1900-01-01

## 2024-11-07 ENCOUNTER — OFFICE (OUTPATIENT)
Dept: URBAN - METROPOLITAN AREA CLINIC 105 | Facility: CLINIC | Age: 74
Setting detail: OPHTHALMOLOGY
End: 2024-11-07
Payer: MEDICARE

## 2024-11-07 ENCOUNTER — RX ONLY (RX ONLY)
Age: 74
End: 2024-11-07

## 2024-11-07 DIAGNOSIS — H16.223: ICD-10-CM

## 2024-11-07 DIAGNOSIS — H00.14: ICD-10-CM

## 2024-11-07 PROCEDURE — 99213 OFFICE O/P EST LOW 20 MIN: CPT | Performed by: OPHTHALMOLOGY

## 2024-11-07 ASSESSMENT — REFRACTION_AUTOREFRACTION
OD_SPHERE: +0.50
OS_AXIS: 070
OD_AXIS: 118
OS_SPHERE: +0.50
OS_CYLINDER: -0.75
OD_CYLINDER: -1.00

## 2024-11-07 ASSESSMENT — KERATOMETRY
OS_K2POWER_DIOPTERS: 44.25
OS_AXISANGLE_DEGREES: 109
OD_K2POWER_DIOPTERS: 44.25
OD_K1POWER_DIOPTERS: 43.75
OD_AXISANGLE_DEGREES: 027
OS_K1POWER_DIOPTERS: 43.25

## 2024-11-07 ASSESSMENT — PACHYMETRY
OD_CT_UM: 559
OD_CT_CORRECTION: -1
OS_CT_CORRECTION: -1
OS_CT_UM: 552

## 2024-11-07 ASSESSMENT — VISUAL ACUITY
OD_BCVA: 20/25+
OS_BCVA: 20/20

## 2024-11-07 ASSESSMENT — TEAR BREAK UP TIME (TBUT)
OD_TBUT: 2+ 3+
OS_TBUT: 2+

## 2024-11-07 ASSESSMENT — REFRACTION_MANIFEST
OD_CYLINDER: -0.50
OD_AXIS: 115
OD_VA1: 20/20
OD_SPHERE: PLANO

## 2024-11-07 ASSESSMENT — TONOMETRY
OD_IOP_MMHG: 16
OS_IOP_MMHG: 19

## 2024-11-07 ASSESSMENT — CONFRONTATIONAL VISUAL FIELD TEST (CVF)
OS_FINDINGS: FULL
OD_FINDINGS: FULL

## 2024-11-07 ASSESSMENT — LID EXAM ASSESSMENTS: OD_COMMENTS: DERMATITIS RUL - MILD ERYTHEMA AND FLAKING

## 2024-11-14 LAB — CYTOLOGY CVX/VAG DOC THIN PREP: NORMAL

## 2024-12-05 ENCOUNTER — OFFICE (OUTPATIENT)
Dept: URBAN - METROPOLITAN AREA CLINIC 105 | Facility: CLINIC | Age: 74
Setting detail: OPHTHALMOLOGY
End: 2024-12-05
Payer: MEDICARE

## 2024-12-05 DIAGNOSIS — H01.00B: ICD-10-CM

## 2024-12-05 DIAGNOSIS — H16.223: ICD-10-CM

## 2024-12-05 DIAGNOSIS — H01.00A: ICD-10-CM

## 2024-12-05 DIAGNOSIS — H00.14: ICD-10-CM

## 2024-12-05 PROBLEM — H01.002 BLEPHARITIS; RIGHT UPPER LID, RIGHT LOWER LID, LEFT UPPER LID, LEFT LOWER LID: Status: ACTIVE | Noted: 2024-12-05

## 2024-12-05 PROBLEM — H01.005 BLEPHARITIS; RIGHT UPPER LID, RIGHT LOWER LID, LEFT UPPER LID, LEFT LOWER LID: Status: ACTIVE | Noted: 2024-12-05

## 2024-12-05 PROBLEM — H01.004 BLEPHARITIS; RIGHT UPPER LID, RIGHT LOWER LID, LEFT UPPER LID, LEFT LOWER LID: Status: ACTIVE | Noted: 2024-12-05

## 2024-12-05 PROBLEM — H01.001 BLEPHARITIS; RIGHT UPPER LID, RIGHT LOWER LID, LEFT UPPER LID, LEFT LOWER LID: Status: ACTIVE | Noted: 2024-12-05

## 2024-12-05 PROCEDURE — 92012 INTRM OPH EXAM EST PATIENT: CPT | Mod: 25 | Performed by: OPHTHALMOLOGY

## 2024-12-05 PROCEDURE — 67800 REMOVE EYELID LESION: CPT | Mod: LT | Performed by: OPHTHALMOLOGY

## 2024-12-05 ASSESSMENT — LID EXAM ASSESSMENTS
OD_COMMENTS: DERMATITIS RUL - MILD ERYTHEMA AND FLAKING
OS_BLEPHARITIS: LLL LUL 1+
OD_BLEPHARITIS: RLL RUL 1+

## 2024-12-05 ASSESSMENT — REFRACTION_AUTOREFRACTION
OS_CYLINDER: -0.75
OD_SPHERE: +0.50
OD_CYLINDER: -1.00
OS_SPHERE: +0.50
OD_AXIS: 118
OS_AXIS: 070

## 2024-12-05 ASSESSMENT — KERATOMETRY
OD_AXISANGLE_DEGREES: 027
OS_K1POWER_DIOPTERS: 43.25
OS_AXISANGLE_DEGREES: 109
OD_K2POWER_DIOPTERS: 44.25
OD_K1POWER_DIOPTERS: 43.75
OS_K2POWER_DIOPTERS: 44.25

## 2024-12-05 ASSESSMENT — TEAR BREAK UP TIME (TBUT)
OD_TBUT: 2+ 3+
OS_TBUT: 2+

## 2024-12-05 ASSESSMENT — CONFRONTATIONAL VISUAL FIELD TEST (CVF)
OS_FINDINGS: FULL
OD_FINDINGS: FULL

## 2024-12-05 ASSESSMENT — VISUAL ACUITY
OS_BCVA: 20/30-
OD_BCVA: 20/30

## 2024-12-05 ASSESSMENT — PACHYMETRY
OS_CT_CORRECTION: -1
OS_CT_UM: 552
OD_CT_CORRECTION: -1
OD_CT_UM: 559

## 2024-12-05 ASSESSMENT — REFRACTION_MANIFEST
OD_VA1: 20/20
OD_CYLINDER: -0.50
OD_AXIS: 115
OD_SPHERE: PLANO

## 2024-12-05 ASSESSMENT — TONOMETRY
OS_IOP_MMHG: 19
OD_IOP_MMHG: 18

## 2025-01-08 ENCOUNTER — APPOINTMENT (OUTPATIENT)
Dept: OBGYN | Facility: CLINIC | Age: 75
End: 2025-01-08

## 2025-01-08 VITALS
HEIGHT: 65 IN | SYSTOLIC BLOOD PRESSURE: 153 MMHG | WEIGHT: 165 LBS | DIASTOLIC BLOOD PRESSURE: 81 MMHG | BODY MASS INDEX: 27.49 KG/M2

## 2025-01-08 DIAGNOSIS — N90.89 OTHER SPECIFIED NONINFLAMMATORY DISORDERS OF VULVA AND PERINEUM: ICD-10-CM

## 2025-01-08 DIAGNOSIS — N76.3 SUBACUTE AND CHRONIC VULVITIS: ICD-10-CM

## 2025-01-08 PROCEDURE — 56606 BIOPSY OF VULVA/PERINEUM: CPT

## 2025-01-08 PROCEDURE — 99213 OFFICE O/P EST LOW 20 MIN: CPT | Mod: 25

## 2025-01-08 PROCEDURE — 56605 BIOPSY OF VULVA/PERINEUM: CPT

## 2025-01-13 ENCOUNTER — APPOINTMENT (OUTPATIENT)
Dept: OBGYN | Facility: CLINIC | Age: 75
End: 2025-01-13
Payer: MEDICARE

## 2025-01-13 VITALS
HEIGHT: 65 IN | DIASTOLIC BLOOD PRESSURE: 78 MMHG | SYSTOLIC BLOOD PRESSURE: 122 MMHG | WEIGHT: 165 LBS | BODY MASS INDEX: 27.49 KG/M2

## 2025-01-13 DIAGNOSIS — L90.0 LICHEN SCLEROSUS ET ATROPHICUS: ICD-10-CM

## 2025-01-13 LAB — CORE LAB BIOPSY: NORMAL

## 2025-01-13 PROCEDURE — 99214 OFFICE O/P EST MOD 30 MIN: CPT

## 2025-01-13 RX ORDER — CLOBETASOL PROPIONATE 0.5 MG/G
0.05 OINTMENT TOPICAL
Qty: 1 | Refills: 2 | Status: ACTIVE | COMMUNITY
Start: 2025-01-13 | End: 1900-01-01

## 2025-04-01 ENCOUNTER — OFFICE (OUTPATIENT)
Facility: LOCATION | Age: 75
Setting detail: OPHTHALMOLOGY
End: 2025-04-01
Payer: MEDICARE

## 2025-04-01 DIAGNOSIS — H01.004: ICD-10-CM

## 2025-04-01 DIAGNOSIS — H00.15: ICD-10-CM

## 2025-04-01 DIAGNOSIS — H01.005: ICD-10-CM

## 2025-04-01 DIAGNOSIS — H00.11: ICD-10-CM

## 2025-04-01 DIAGNOSIS — H01.001: ICD-10-CM

## 2025-04-01 DIAGNOSIS — H01.002: ICD-10-CM

## 2025-04-01 PROCEDURE — 99213 OFFICE O/P EST LOW 20 MIN: CPT | Performed by: OPHTHALMOLOGY

## 2025-04-01 ASSESSMENT — TEAR BREAK UP TIME (TBUT)
OD_TBUT: 2+ 3+
OS_TBUT: 2+

## 2025-04-01 ASSESSMENT — CONFRONTATIONAL VISUAL FIELD TEST (CVF)
OS_FINDINGS: FULL
OD_FINDINGS: FULL

## 2025-04-04 ASSESSMENT — REFRACTION_AUTOREFRACTION
OD_SPHERE: +0.50
OS_AXIS: 070
OS_CYLINDER: -0.75
OD_AXIS: 118
OS_SPHERE: +0.50
OD_CYLINDER: -1.00

## 2025-04-04 ASSESSMENT — REFRACTION_MANIFEST
OD_AXIS: 115
OD_SPHERE: PLANO
OD_CYLINDER: -0.50
OD_VA1: 20/20

## 2025-04-04 ASSESSMENT — KERATOMETRY
OD_K2POWER_DIOPTERS: 44.25
OS_K2POWER_DIOPTERS: 44.25
OS_K1POWER_DIOPTERS: 43.25
OD_AXISANGLE_DEGREES: 027
OS_AXISANGLE_DEGREES: 109
OD_K1POWER_DIOPTERS: 43.75

## 2025-04-04 ASSESSMENT — VISUAL ACUITY
OD_BCVA: 20/25-1
OS_BCVA: 20/25-2

## 2025-04-22 ENCOUNTER — RX ONLY (RX ONLY)
Age: 75
End: 2025-04-22

## 2025-04-22 ENCOUNTER — OFFICE (OUTPATIENT)
Facility: LOCATION | Age: 75
Setting detail: OPHTHALMOLOGY
End: 2025-04-22
Payer: MEDICARE

## 2025-04-22 DIAGNOSIS — H00.11: ICD-10-CM

## 2025-04-22 DIAGNOSIS — H01.005: ICD-10-CM

## 2025-04-22 DIAGNOSIS — H01.001: ICD-10-CM

## 2025-04-22 DIAGNOSIS — H00.15: ICD-10-CM

## 2025-04-22 DIAGNOSIS — H01.004: ICD-10-CM

## 2025-04-22 DIAGNOSIS — H01.002: ICD-10-CM

## 2025-04-22 PROCEDURE — 99213 OFFICE O/P EST LOW 20 MIN: CPT | Performed by: OPHTHALMOLOGY

## 2025-04-22 ASSESSMENT — LID EXAM ASSESSMENTS
OS_BLEPHARITIS: LLL LUL 1+
OD_COMMENTS: DERMATITIS RUL - MILD ERYTHEMA AND FLAKING
OD_BLEPHARITIS: RLL RUL 1+

## 2025-04-22 ASSESSMENT — TEAR BREAK UP TIME (TBUT)
OS_TBUT: 2+
OD_TBUT: 2+ 3+

## 2025-04-24 ASSESSMENT — REFRACTION_MANIFEST
OD_VA1: 20/20
OD_CYLINDER: -0.50
OD_SPHERE: PLANO
OD_AXIS: 115

## 2025-04-24 ASSESSMENT — VISUAL ACUITY
OD_BCVA: 20/20-2
OS_BCVA: 20/20-2

## 2025-04-24 ASSESSMENT — REFRACTION_AUTOREFRACTION
OS_AXIS: 070
OD_CYLINDER: -1.00
OD_SPHERE: +0.50
OD_AXIS: 118
OS_SPHERE: +0.50
OS_CYLINDER: -0.75

## 2025-04-24 ASSESSMENT — KERATOMETRY
OD_AXISANGLE_DEGREES: 027
OD_K2POWER_DIOPTERS: 44.25
OS_K1POWER_DIOPTERS: 43.25
OS_K2POWER_DIOPTERS: 44.25
OD_K1POWER_DIOPTERS: 43.75
OS_AXISANGLE_DEGREES: 109

## 2025-05-05 ENCOUNTER — NON-APPOINTMENT (OUTPATIENT)
Age: 75
End: 2025-05-05

## 2025-05-07 ENCOUNTER — APPOINTMENT (OUTPATIENT)
Dept: OBGYN | Facility: CLINIC | Age: 75
End: 2025-05-07
Payer: MEDICARE

## 2025-05-07 VITALS
BODY MASS INDEX: 27.49 KG/M2 | DIASTOLIC BLOOD PRESSURE: 62 MMHG | HEIGHT: 65 IN | WEIGHT: 165 LBS | SYSTOLIC BLOOD PRESSURE: 120 MMHG

## 2025-05-07 DIAGNOSIS — L90.0 LICHEN SCLEROSUS ET ATROPHICUS: ICD-10-CM

## 2025-05-07 PROCEDURE — 99213 OFFICE O/P EST LOW 20 MIN: CPT

## 2025-05-08 ENCOUNTER — APPOINTMENT (OUTPATIENT)
Dept: DERMATOLOGY | Facility: CLINIC | Age: 75
End: 2025-05-08
Payer: MEDICARE

## 2025-05-08 PROCEDURE — 99213 OFFICE O/P EST LOW 20 MIN: CPT

## 2025-06-16 ENCOUNTER — OFFICE (OUTPATIENT)
Dept: URBAN - METROPOLITAN AREA CLINIC 113 | Facility: CLINIC | Age: 75
Setting detail: OPHTHALMOLOGY
End: 2025-06-16
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
## 2025-06-16 PROCEDURE — NO SHOW FE NO SHOW FEE: Performed by: STUDENT IN AN ORGANIZED HEALTH CARE EDUCATION/TRAINING PROGRAM

## 2025-06-26 ENCOUNTER — RX ONLY (RX ONLY)
Age: 75
End: 2025-06-26

## 2025-06-26 ENCOUNTER — OFFICE (OUTPATIENT)
Facility: LOCATION | Age: 75
Setting detail: OPHTHALMOLOGY
End: 2025-06-26
Payer: MEDICARE

## 2025-06-26 DIAGNOSIS — H26.491: ICD-10-CM

## 2025-06-26 DIAGNOSIS — H43.813: ICD-10-CM

## 2025-06-26 DIAGNOSIS — H40.013: ICD-10-CM

## 2025-06-26 DIAGNOSIS — H01.004: ICD-10-CM

## 2025-06-26 DIAGNOSIS — H10.013: ICD-10-CM

## 2025-06-26 DIAGNOSIS — H01.002: ICD-10-CM

## 2025-06-26 DIAGNOSIS — H01.005: ICD-10-CM

## 2025-06-26 DIAGNOSIS — H01.001: ICD-10-CM

## 2025-06-26 PROCEDURE — 92014 COMPRE OPH EXAM EST PT 1/>: CPT | Performed by: OPHTHALMOLOGY

## 2025-06-26 PROCEDURE — 92250 FUNDUS PHOTOGRAPHY W/I&R: CPT | Performed by: OPHTHALMOLOGY

## 2025-06-26 ASSESSMENT — REFRACTION_AUTOREFRACTION
OS_AXIS: 151
OD_SPHERE: -0.25
OS_CYLINDER: +0.50
OD_AXIS: 038
OD_CYLINDER: +0.75
OS_SPHERE: -0.25

## 2025-06-26 ASSESSMENT — REFRACTION_MANIFEST
OD_VA1: 20/20
OD_CYLINDER: -0.50
OD_SPHERE: PLANO
OD_AXIS: 115

## 2025-06-26 ASSESSMENT — REFRACTION_CURRENTRX
OD_SPHERE: +1.75
OS_CYLINDER: SPHERE
OD_OVR_VA: 20/
OD_CYLINDER: SPHERE
OS_OVR_VA: 20/
OS_SPHERE: +1.75

## 2025-06-26 ASSESSMENT — PACHYMETRY
OS_CT_CORRECTION: -1
OS_CT_UM: 552
OD_CT_CORRECTION: -1
OD_CT_UM: 559

## 2025-06-26 ASSESSMENT — CONFRONTATIONAL VISUAL FIELD TEST (CVF)
OD_FINDINGS: FULL
OS_FINDINGS: FULL

## 2025-06-26 ASSESSMENT — VISUAL ACUITY
OS_BCVA: 20/20-
OD_BCVA: 20/20-

## 2025-06-26 ASSESSMENT — KERATOMETRY
OS_AXISANGLE_DEGREES: 109
OD_AXISANGLE_DEGREES: 027
OD_K1POWER_DIOPTERS: 43.75
OS_K2POWER_DIOPTERS: 44.25
OD_K2POWER_DIOPTERS: 44.25
OS_K1POWER_DIOPTERS: 43.25

## 2025-06-26 ASSESSMENT — LID EXAM ASSESSMENTS
OD_BLEPHARITIS: RLL RUL 1+
OS_BLEPHARITIS: LLL LUL 1+
OD_COMMENTS: DERMATITIS RUL - MILD ERYTHEMA AND FLAKING

## 2025-06-26 ASSESSMENT — TONOMETRY
OD_IOP_MMHG: 15
OS_IOP_MMHG: 15

## 2025-06-26 ASSESSMENT — TEAR BREAK UP TIME (TBUT)
OS_TBUT: 2+
OD_TBUT: 2+ 3+

## 2025-08-25 ENCOUNTER — NON-APPOINTMENT (OUTPATIENT)
Age: 75
End: 2025-08-25